# Patient Record
Sex: FEMALE | Race: AMERICAN INDIAN OR ALASKA NATIVE
[De-identification: names, ages, dates, MRNs, and addresses within clinical notes are randomized per-mention and may not be internally consistent; named-entity substitution may affect disease eponyms.]

---

## 2020-08-15 ENCOUNTER — HOSPITAL ENCOUNTER (OUTPATIENT)
Dept: HOSPITAL 5 - ED | Age: 50
Setting detail: OBSERVATION
LOS: 1 days | Discharge: HOME | End: 2020-08-16
Attending: INTERNAL MEDICINE | Admitting: INTERNAL MEDICINE
Payer: COMMERCIAL

## 2020-08-15 DIAGNOSIS — R94.31: ICD-10-CM

## 2020-08-15 DIAGNOSIS — R07.89: Primary | ICD-10-CM

## 2020-08-15 DIAGNOSIS — Z79.899: ICD-10-CM

## 2020-08-15 LAB
BASOPHILS # (AUTO): 0 K/MM3 (ref 0–0.1)
BASOPHILS NFR BLD AUTO: 0.8 % (ref 0–1.8)
BUN SERPL-MCNC: 7 MG/DL (ref 7–17)
BUN/CREAT SERPL: 9 %
CALCIUM SERPL-MCNC: 8.7 MG/DL (ref 8.4–10.2)
EOSINOPHIL # BLD AUTO: 0 K/MM3 (ref 0–0.4)
EOSINOPHIL NFR BLD AUTO: 1.3 % (ref 0–4.3)
HCT VFR BLD CALC: 41.9 % (ref 30.3–42.9)
HEMOLYSIS INDEX: 2
HGB BLD-MCNC: 14.3 GM/DL (ref 10.1–14.3)
INR PPP: 1.08 (ref 0.87–1.13)
LYMPHOCYTES # BLD AUTO: 1.2 K/MM3 (ref 1.2–5.4)
LYMPHOCYTES NFR BLD AUTO: 34.6 % (ref 13.4–35)
MCHC RBC AUTO-ENTMCNC: 34 % (ref 30–34)
MCV RBC AUTO: 102 FL (ref 79–97)
MONOCYTES # (AUTO): 0.2 K/MM3 (ref 0–0.8)
MONOCYTES % (AUTO): 6.4 % (ref 0–7.3)
PLATELET # BLD: 196 K/MM3 (ref 140–440)
RBC # BLD AUTO: 4.12 M/MM3 (ref 3.65–5.03)

## 2020-08-15 PROCEDURE — 80053 COMPREHEN METABOLIC PANEL: CPT

## 2020-08-15 PROCEDURE — 93005 ELECTROCARDIOGRAM TRACING: CPT

## 2020-08-15 PROCEDURE — 83735 ASSAY OF MAGNESIUM: CPT

## 2020-08-15 PROCEDURE — 71046 X-RAY EXAM CHEST 2 VIEWS: CPT

## 2020-08-15 PROCEDURE — 85025 COMPLETE CBC W/AUTO DIFF WBC: CPT

## 2020-08-15 PROCEDURE — 84484 ASSAY OF TROPONIN QUANT: CPT

## 2020-08-15 PROCEDURE — 85610 PROTHROMBIN TIME: CPT

## 2020-08-15 PROCEDURE — 99285 EMERGENCY DEPT VISIT HI MDM: CPT

## 2020-08-15 PROCEDURE — 85379 FIBRIN DEGRADATION QUANT: CPT

## 2020-08-15 PROCEDURE — 82550 ASSAY OF CK (CPK): CPT

## 2020-08-15 PROCEDURE — G0378 HOSPITAL OBSERVATION PER HR: HCPCS

## 2020-08-15 PROCEDURE — 71275 CT ANGIOGRAPHY CHEST: CPT

## 2020-08-15 PROCEDURE — 84443 ASSAY THYROID STIM HORMONE: CPT

## 2020-08-15 PROCEDURE — 83036 HEMOGLOBIN GLYCOSYLATED A1C: CPT

## 2020-08-15 PROCEDURE — 36415 COLL VENOUS BLD VENIPUNCTURE: CPT

## 2020-08-15 PROCEDURE — 80048 BASIC METABOLIC PNL TOTAL CA: CPT

## 2020-08-15 NOTE — HISTORY AND PHYSICAL REPORT
History of Present Illness


Date of examination: 08/15/20


Date of admission: 


08/15/20 15:13





Chief complaint: 


cHEST PAIN since a.m.





History of present illness: 


50-year-old female with no significant past medical history comes in for left-

sided chest pain.  With no radiation.  No diaphoresis.  No palpitations.  

Intermittent in nature.  Chest pain is about 5 on a scale of 1-10.  No 

exacerbating or relieving factors.











Past History


Past Medical History: No medical history


Past Surgical History: No surgical history


Social history: no significant social history, lives with family, full code


Family history: hypertension





Medications and Allergies


                                    Allergies











Allergy/AdvReac Type Severity Reaction Status Date / Time


 


No Known Allergies Allergy   Unverified 08/15/20 11:04











                                Home Medications











 Medication  Instructions  Recorded  Confirmed  Last Taken  Type


 


No Known Home Medications [No  08/15/20 08/15/20 Unknown History





Reported Home Medications]     











Active Meds: 


Active Medications





Nitroglycerin (Nitrostat)  0.4 mg SL .Q5MIN PRN


   PRN Reason: Chest Pain











Review of Systems


All systems: negative


Cardiovascular: chest pain





Exam





- Constitutional


Vitals: 


                                        











Temp Pulse Resp BP Pulse Ox


 


 98.4 F   53 L  18   136/81   98 


 


 08/15/20 20:12  08/15/20 20:25  08/15/20 20:12  08/15/20 20:12  08/15/20 20:12











General appearance: Present: no acute distress, well-nourished





- EENT


Eyes: Present: PERRL


ENT: hearing intact, clear oral mucosa





- Neck


Neck: Present: supple, normal ROM





- Respiratory


Respiratory effort: normal


Respiratory: bilateral: CTA





- Cardiovascular


Heart rate: 78


Rhythm: regular


Heart Sounds: Present: S1 & S2.  Absent: rub, click





- Extremities


Extremities: pulses symmetrical, No edema


Peripheral Pulses: within normal limits





- Abdominal


General gastrointestinal: Present: soft, non-tender, non-distended, normal bowel

sounds


Female genitourinary: Present: normal





- Rectal


Rectal Exam: deferred





- Integumentary


Integumentary: Present: clear, warm, dry





- Musculoskeletal


Musculoskeletal: gait normal, strength equal bilaterally





- Psychiatric


Psychiatric: appropriate mood/affect, intact judgment & insight





- Neurologic


Neurologic: CNII-XII intact, moves all extremities





HEART Score





- HEART Score


EKG: Non-specific


Age: 45-65


Risk factors: 1-2 risk factors


Troponin: 


                                        











Troponin T  < 0.010 ng/mL (0.00-0.029)   08/15/20  14:06    











Troponin: < normal limit





- Critical Actions


Critical Actions: 0-3 pts:0.9-1.7%risk of adverse cardiac event.Candidate for 

discharge





Results





- Labs


CBC & Chem 7: 


                                 08/16/20 07:25





                                 08/16/20 07:25


Labs: 


                             Laboratory Last Values











WBC  3.6 K/mm3 (4.5-11.0)  L  08/15/20  14:06    


 


RBC  4.12 M/mm3 (3.65-5.03)   08/15/20  14:06    


 


Hgb  14.3 gm/dl (10.1-14.3)   08/15/20  14:06    


 


Hct  41.9 % (30.3-42.9)   08/15/20  14:06    


 


MCV  102 fl (79-97)  H  08/15/20  14:06    


 


MCH  35 pg (28-32)  H  08/15/20  14:06    


 


MCHC  34 % (30-34)   08/15/20  14:06    


 


RDW  14.7 % (13.2-15.2)   08/15/20  14:06    


 


Plt Count  196 K/mm3 (140-440)   08/15/20  14:06    


 


Lymph % (Auto)  34.6 % (13.4-35.0)   08/15/20  14:06    


 


Mono % (Auto)  6.4 % (0.0-7.3)   08/15/20  14:06    


 


Eos % (Auto)  1.3 % (0.0-4.3)   08/15/20  14:06    


 


Baso % (Auto)  0.8 % (0.0-1.8)   08/15/20  14:06    


 


Lymph #  1.2 K/mm3 (1.2-5.4)   08/15/20  14:06    


 


Mono #  0.2 K/mm3 (0.0-0.8)   08/15/20  14:06    


 


Eos #  0.0 K/mm3 (0.0-0.4)   08/15/20  14:06    


 


Baso #  0.0 K/mm3 (0.0-0.1)   08/15/20  14:06    


 


Seg Neutrophils %  56.9 % (40.0-70.0)   08/15/20  14:06    


 


Seg Neutrophils #  2.0 K/mm3 (1.8-7.7)   08/15/20  14:06    


 


PT  14.1 Sec. (12.2-14.9)   08/15/20  13:02    


 


INR  1.08  (0.87-1.13)   08/15/20  13:02    


 


D-Dimer  904.61 ng/mlDDU (0-234)  H  08/15/20  13:02    


 


Sodium  139 mmol/L (137-145)   08/15/20  13:02    


 


Potassium  4.1 mmol/L (3.6-5.0)   08/15/20  13:02    


 


Chloride  104.9 mmol/L ()   08/15/20  13:02    


 


Carbon Dioxide  26 mmol/L (22-30)   08/15/20  13:02    


 


Anion Gap  12 mmol/L  08/15/20  13:02    


 


BUN  7 mg/dL (7-17)   08/15/20  13:02    


 


Creatinine  0.8 mg/dL (0.6-1.2)   08/15/20  13:02    


 


Estimated GFR  > 60 ml/min  08/15/20  13:02    


 


BUN/Creatinine Ratio  9 %  08/15/20  13:02    


 


Glucose  103 mg/dL ()  H  08/15/20  13:02    


 


Calcium  8.7 mg/dL (8.4-10.2)   08/15/20  13:02    


 


Magnesium  2.00 mg/dL (1.7-2.3)   08/15/20  13:02    


 


Total Creatine Kinase  83 units/L ()   08/15/20  13:02    


 


Troponin T  < 0.010 ng/mL (0.00-0.029)   08/15/20  14:06    


 


TSH  0.848 mlU/mL (0.270-4.200)   08/15/20  13:02    








                                    Short CBC











  08/16/20 Range/Units





  07:25 


 


WBC  3.2 L  (4.5-11.0)  K/mm3


 


Hgb  13.5  (10.1-14.3)  gm/dl


 


Hct  39.3  (30.3-42.9)  %


 


Plt Count  171  (140-440)  K/mm3








                                       BMP











  08/16/20





  07:25


 


Sodium  138


 


Potassium  4.6


 


Chloride  103.2


 


Carbon Dioxide  24


 


BUN  7


 


Creatinine  0.9


 


Glucose  91


 


Calcium  8.8








                                 Cardiac Enzymes











  08/15/20 08/16/20 08/16/20 Range/Units





  22:46 07:25 14:28 


 


Troponin T  < 0.010  < 0.010  < 0.010  (0.00-0.029)  ng/mL








                                 Liver Function











  08/16/20 Range/Units





  07:25 


 


Total Bilirubin  0.60  (0.1-1.2)  mg/dL


 


AST  18  (5-40)  units/L


 


ALT  9  (7-56)  units/L


 


Alkaline Phosphatase  52  ()  units/L


 


Albumin  3.6 L  (3.9-5)  g/dL














- Imaging and Cardiology


EKG: report reviewed


Quarles/IV: 


                                        





IV Catheter Type [Right          INT / Saline Lock


Antecubital]                     











Assessment and Plan


Advance Directives: Yes (Full code)


VTE prophylaxis?: Chemical


Plan of care discussed with patient/family: Yes





- Patient Problems


(1) Acute chest pain


Current Visit: Yes   Status: Acute   


Plan to address problem: 


Serial troponins


Lexiscan as outpatient as  Lexiscan is not available on Sundays








(2) DVT prophylaxis


Current Visit: Yes   Status: Acute   


Plan to address problem: 


On Lovenox and GI prophylaxis

## 2020-08-15 NOTE — EMERGENCY DEPARTMENT REPORT
ED Chest Pain HPI





- General


Chief Complaint: Chest Pain


Stated Complaint: CHEST PAIN


PUI?: No


Time Seen by Provider: 08/15/20 12:28


Source: patient


Mode of arrival: Ambulatory


Limitations: No Limitations





- History of Present Illness


Initial Comments: 





During the entire history and physical examination, I am chaperone and escorted 

by nurse Ila Uribe





The patient is a 50-year-old female, right-hand-dominant, who is not known to 

myself previously, reports a history of tobacco use, TIA/mini stroke versus a 

stroke 10 years ago, she is not certain.





She presents to the ER with complaint of left-sided chest pain, but does not rad

iate to the back, arms or neck, which is associated with left anterior bicep 

pain, that started last night.  There is no  diaphoresis, or exertional 

shortness of breath.  She denies DVT and pulmonary embolism risk factors.


She states that she thought the pain was "gas", and thus drink plenty of soda, 

and tried to make herself gag/throw up yesterday, and therefore, had some mild 

nausea yesterday.





Her pain is much improved at this time.  She denies headache, neck pain, 

abdominal pain, hematemesis, bright red blood per rectum, urinary symptoms.  No 

cardiac family history that she is aware of.  She is an only child.  She 

recreationally consumes tobacco and marijuana.  No recent cardiac risk rat

ification that she is aware


MD Complaint: chest pain, other


-: Sudden, hour(s)


Onset: after eating


Pain Location: left chest


Pain Radiation: none


Severity: mild


Severity scale (0 -10): 1 (Currently reports pain is a 1 out of 10)


Quality: aching


Consistency: intermittent


Improves With: rest


Worsens With: eating, palpation


Aspirin use within the Past 7 Days: (0) No





- Related Data


                                    Allergies











Allergy/AdvReac Type Severity Reaction Status Date / Time


 


No Known Allergies Allergy   Unverified 08/15/20 11:04














Heart Score





- HEART Score


History: Slightly suspicious


EKG: Non-specific


Age: 45-65


Risk factors: 1-2 risk factors


Troponin: < normal limit


HEART Score: 3





- Critical Actions


Critical Actions: 0-3 pts:0.9-1.7%risk of adverse cardiac event.Candidate for 

discharge





ED Review of Systems


ROS: 


Stated complaint: CHEST PAIN


Other details as noted in HPI





Constitutional: denies: diaphoresis, malaise


Eyes: denies: vision change


ENT: denies: epistaxis


Respiratory: denies: cough, shortness of breath


Cardiovascular: chest pain


Gastrointestinal: denies: vomiting, hematemesis, melena, hematochezia


Genitourinary: denies: dysuria


Musculoskeletal: arthralgia, myalgia


Neurological: denies: weakness


Hematological/Lymphatic: denies: easy bleeding





ED Past Medical Hx





- Past Medical History


Previous Medical History?: No





- Surgical History


Past Surgical History?: No





- Social History


Smoking Status: Current Every Day Smoker


Substance Use Type: Alcohol, Marijuana





ED Physical Exam





- General


Limitations: No Limitations


General appearance: alert, in no apparent distress





- Head


Head exam: Present: atraumatic, normocephalic





- Eye


Eye exam: Present: normal appearance, EOMI.  Absent: nystagmus





- ENT


ENT exam: Present: normal exam, normal orophraynx, mucous membranes moist, 

normal external ear exam





- Neck


Neck exam: Present: normal inspection, full ROM.  Absent: tenderness, 

meningismus





- Respiratory


Respiratory exam: Present: normal lung sounds bilaterally, chest wall 

tenderness.  Absent: respiratory distress, wheezes, rales, rhonchi, stridor





- Cardiovascular


Cardiovascular Exam: Present: regular rate, normal rhythm, normal heart sounds. 

Absent: bradycardia, tachycardia, irregular rhythm, systolic murmur, diastolic 

murmur, rubs, gallop





- GI/Abdominal


GI/Abdominal exam: Present: soft, normal bowel sounds.  Absent: distended, 

tenderness, guarding, rebound, rigid, pulsatile mass





- Extremities Exam


Extremities exam: Present: normal inspection, full ROM, other (2+ pulses noted 

in the bilateral upper and lower extremities.  There is no palpable cord.   

negative Homans sign.  Muscular compartments are soft.  The pelvis is stable.). 

Absent: pedal edema, calf tenderness





- Back Exam


Back exam: Present: normal inspection, full ROM.  Absent: tenderness, CVA 

tenderness (R), CVA tenderness (L), paraspinal tenderness, vertebral tenderness





- Neurological Exam


Neurological exam: Present: alert, normal gait, other (No facial droop.  Tongue 

midline.  Extraocular movements intact bilaterally.  Facial sensation intact to 

light touch in V1, V2, V3 distribution bilaterally.  5 and a 5 strength in 4 

extremities.  Sensation intact to light touch in 4 extremities.).  Absent: motor

sensory deficit





- Psychiatric


Psychiatric exam: Present: normal affect, normal mood





- Skin


Skin exam: Present: warm, dry, intact, normal color.  Absent: rash





ED Course


                                   Vital Signs











  08/15/20 08/15/20





  11:05 13:00


 


Temperature 98 F 


 


Pulse Rate 71 57 L


 


Respiratory 20 16





Rate  


 


Blood Pressure 139/89 132/87


 


O2 Sat by Pulse 100 97





Oximetry  














- Reevaluation(s)


Reevaluation #1: 





08/15/20 12:54


Differential diagnosis, including but not limited to: GERD, gastritis, hiatal 

hernia, pneumonia, costochondritis, acute coronary syndrome, pulmonary embolism





Assessment and plan: 50-year-old female, who is not currently tachycardic, 

tachypneic or hypoxic, who denies DVT and pulmonary embolism risk factors, who 

is low risk by Wells criteria, with left-sided reproducible chest wall pain, 

which is not exertional, without shortness of breath, history of left bicep 

pain, she specifically told me the pain did not radiate.





However, EKG fairly abnormal without prior for comparison, including numerous T 

wave abnormalities, and left axis deviation.





We appreciate that the patient is low risk for major adverse cardiac event as 

per heart score at this time.  We will treat her symptoms, obtain appropriate 

laboratory studies, and reassess.





Discussed the plan of care with the patient, who verbalized understanding, and 

who is amenable to this plan of care.








Reevaluation #2: 





08/15/20 15:10





CT scan of the chest is negative for acute findings.  CBC pending.  I have 

contacted the lab multiple times today, and requested that the laboratory 

received/result the CBC.  They are still trying to figure this out.





I have discussed the patient's findings with her, she denies hematemesis and 

bright red blood per rectum.  We would recommend admission to the medical 

service for cardiac risk ratification.





Discussed this plan of care with the patient, who verbalized understanding, and 

who is amenable to hospitalization/admission.





She is asking to eat, she can eat at this time.





Contacted the hospital physician, Dr. MELVINA Flores , who has accepted the patient to 

the medical service 





he agrees to follow-up on the CBC.


























08/15/20 15:13








JORGE L score





- Jorge L Score


Age > 65: (0) No


Aspirin use within the Past 7 Days: (0) No


3 or more CAD Risk Factors: (0) No


2 or more Angina events in past 24 hrs: (0) No


Known CAD with more than 50% Stenosis: (0) No


Elevated Cardiac Markers: (0) No


ST Deviation Greater than 0.5mm: (0) No


JORGE L Score: 0





ED Medical Decision Making





- Lab Data


Result diagrams: 


                                 08/15/20 13:02








                                   Vital Signs











  08/15/20





  11:05


 


Temperature 98 F


 


Pulse Rate 71


 


Respiratory 20





Rate 


 


Blood Pressure 139/89


 


O2 Sat by Pulse 100





Oximetry 














- EKG Data


-: EKG Interpreted by Me


EKG shows normal: sinus rhythm


Rate: normal





- EKG Data


When compared to previous EKG there are: previous EKG unavailable





08/15/20 12:50


There is no prior EKG available for comparison.  The EKG is abnormal.  The EKG 

is not a STEMI.





Sinus rhythm, 80 bpm, left axis deviation, borderline left anterior fascicular 

block, T wave inversions V2, V3, abnormal EKG, low voltage in the lateral leads,

the EKG is abnormal.





- Radiology Data


Radiology results: report reviewed, image reviewed





Print Report











Referring Physician: ANNABEL RODRIGUEZ 


 


Patient Name: GIUSEPPE RIVAS 


 


Patient ID: [id number] 


 


YOB: 1970 


 


Sex: Female 


 


Accession: [id number] 


 


Report Date: 2020-08-15 


 


Report Status: Finalized 


 


Findings


[address] [address] 

XRay Report Signed Patient: GIUSEPPE RIVAS MR#: [id number] : 1970

Acct:[id number] Age/Sex: 50 / F ADM Date: 08/15/20 Loc: ED Attending Dr: 

Ordering Physician: ANNABEL RODRIGUEZ MD Date of Service: 08/15/20 Procedure(s): XR chest 

routine 2V Accession Number(s): [id number] cc: ANNABEL RODRIGUEZ MD Fluoro Time In Minutes: 

CHEST 2 VIEWS INDICATION / CLINICAL INFORMATION: Chest Pain. COMPARISON: None 

available. FINDINGS: SUPPORT DEVICES: None. HEART / MEDIASTINUM: No significant 

abnormality. LUNGS / PLEURA: No significant pulmonary or pleural abnormality. No

pneumothorax. ADDITIONAL FINDINGS: No significant additional findings. IMPRES

NEFTALY: 1. No acute findings. Signer Name: Brian Hui MD Signed: 8/15/2020 11:35

AM Workstation Name: [id number] Transcribed By: GILLIAN Dictated By: Brian Hui MD Electronically Authenticated By: Brian Hui MD Signed 

Date/Time: 08/15/20 1135 DD/DT: 08/15/20 1135 TD/TT:   











Critical care attestation.: 


If time is entered above; I have spent that time in minutes in the direct care 

of this critically ill patient, excluding procedure time.








ED Disposition


Clinical Impression: 


 Acute chest pain, Abnormal EKG





Disposition:  OP ADMIT IP TO THIS HOSP


Is pt being admited?: Yes


Does the pt Need Aspirin: No


Condition: Good


Instructions:  Chest Pain (ED)


Referrals: 


PRIMARY CARE,MD [Primary Care Provider] - 3-5 Days

## 2020-08-15 NOTE — XRAY REPORT
CHEST 2 VIEWS 



INDICATION / CLINICAL INFORMATION:

Chest Pain.



COMPARISON: 

None available.



FINDINGS:



SUPPORT DEVICES: None.



HEART / MEDIASTINUM: No significant abnormality. 



LUNGS / PLEURA: No significant pulmonary or pleural abnormality. No pneumothorax. 



ADDITIONAL FINDINGS: No significant additional findings.



IMPRESSION:

1. No acute findings.



Signer Name: Brian Hui MD 

Signed: 8/15/2020 11:35 AM

Workstation Name: [id number]

## 2020-08-16 VITALS — DIASTOLIC BLOOD PRESSURE: 78 MMHG | SYSTOLIC BLOOD PRESSURE: 132 MMHG

## 2020-08-16 LAB
ALBUMIN SERPL-MCNC: 3.6 G/DL (ref 3.9–5)
ALT SERPL-CCNC: 9 UNITS/L (ref 7–56)
BASOPHILS # (AUTO): 0 K/MM3 (ref 0–0.1)
BASOPHILS NFR BLD AUTO: 0.5 % (ref 0–1.8)
BUN SERPL-MCNC: 7 MG/DL (ref 7–17)
BUN/CREAT SERPL: 8 %
CALCIUM SERPL-MCNC: 8.8 MG/DL (ref 8.4–10.2)
EOSINOPHIL # BLD AUTO: 0.1 K/MM3 (ref 0–0.4)
EOSINOPHIL NFR BLD AUTO: 2.6 % (ref 0–4.3)
HCT VFR BLD CALC: 39.3 % (ref 30.3–42.9)
HEMOLYSIS INDEX: 9
HGB BLD-MCNC: 13.5 GM/DL (ref 10.1–14.3)
LYMPHOCYTES # BLD AUTO: 1.3 K/MM3 (ref 1.2–5.4)
LYMPHOCYTES NFR BLD AUTO: 39.4 % (ref 13.4–35)
MCHC RBC AUTO-ENTMCNC: 34 % (ref 30–34)
MCV RBC AUTO: 100 FL (ref 79–97)
MONOCYTES # (AUTO): 0.3 K/MM3 (ref 0–0.8)
MONOCYTES % (AUTO): 8 % (ref 0–7.3)
PLATELET # BLD: 171 K/MM3 (ref 140–440)
RBC # BLD AUTO: 3.95 M/MM3 (ref 3.65–5.03)

## 2020-08-16 NOTE — DISCHARGE SUMMARY
Providers





- Providers


Date of Admission: 


08/15/20 15:13





Date of discharge: 08/16/20


Attending physician: 


KAYLA ADAMSON





Primary care physician: 


PRIMARY CARE MD








Hospitalization


Condition: Good


Hospital course: 





Assessment and Plan


Advance Directives: Yes (Full code)


VTE prophylaxis?: Chemical


Plan of care discussed with patient/family: Yes





- Patient Problems


(1) Acute chest pain


Current Visit: Yes   Status: Acute   


Plan to address problem: 


Serial troponins


Lexiscan as outpatient as  Lexiscan is not available on Sundays


Possible costochondritis--has chest wall tenderness





(2) DVT prophylaxis


Current Visit: Yes   Status: Acute   


Plan to address problem: 


On Lovenox and GI prophylaxis





Disposition: DC-01 TO HOME OR SELFCARE





- Discharge Diagnoses


(1) Acute chest pain


Status: Acute   





(2) DVT prophylaxis


Status: Acute   





Core Measure Documentation





- Palliative Care


Palliative Care/ Comfort Measures: Not Applicable





- Core Measures


Any of the following diagnoses?: none





Exam





- Constitutional


Vitals: 


                                        











Temp Pulse Resp BP Pulse Ox


 


 97.9 F   52 L  20   129/87   100 


 


 08/16/20 17:21  08/16/20 17:21  08/16/20 17:21  08/16/20 17:21  08/16/20 17:21











General appearance: Present: no acute distress, well-nourished





- EENT


Eyes: Present: PERRL


ENT: hearing intact, clear oral mucosa





- Neck


Neck: Present: supple, normal ROM





- Respiratory


Respiratory effort: normal


Respiratory: bilateral: CTA





- Cardiovascular


Heart rate: 78


Rhythm: regular


Heart Sounds: Present: S1 & S2.  Absent: rub, click





- Extremities


Extremities: pulses symmetrical, No edema


Peripheral Pulses: within normal limits





- Abdominal


General gastrointestinal: Present: soft, non-tender, non-distended, normal bowel

sounds


Female genitourinary: Present: normal





- Integumentary


Integumentary: Present: clear, warm, dry





- Musculoskeletal


Musculoskeletal: gait normal, strength equal bilaterally





- Psychiatric


Psychiatric: appropriate mood/affect, intact judgment & insight





- Neurologic


Neurologic: CNII-XII intact, moves all extremities





Plan


Activity: no restrictions


Diet: low fat, low cholesterol, low salt


Follow up with: 


PRIMARY CARE,MD [Primary Care Provider] - 3-5 Days


SAMI TAYLOR MD [Staff Physician] - 7 Days